# Patient Record
Sex: FEMALE | Race: WHITE
[De-identification: names, ages, dates, MRNs, and addresses within clinical notes are randomized per-mention and may not be internally consistent; named-entity substitution may affect disease eponyms.]

---

## 2019-10-09 ENCOUNTER — HOSPITAL ENCOUNTER (EMERGENCY)
Dept: HOSPITAL 74 - JER | Age: 64
Discharge: HOME | End: 2019-10-09
Payer: COMMERCIAL

## 2019-10-09 VITALS — DIASTOLIC BLOOD PRESSURE: 78 MMHG | SYSTOLIC BLOOD PRESSURE: 155 MMHG | HEART RATE: 75 BPM

## 2019-10-09 VITALS — TEMPERATURE: 99.4 F

## 2019-10-09 VITALS — BODY MASS INDEX: 27.8 KG/M2

## 2019-10-09 DIAGNOSIS — E11.9: ICD-10-CM

## 2019-10-09 DIAGNOSIS — R42: Primary | ICD-10-CM

## 2019-10-09 DIAGNOSIS — Z85.3: ICD-10-CM

## 2019-10-09 DIAGNOSIS — I10: ICD-10-CM

## 2019-10-09 LAB
ALBUMIN SERPL-MCNC: 3.8 G/DL (ref 3.4–5)
ALP SERPL-CCNC: 91 U/L (ref 45–117)
ALT SERPL-CCNC: 44 U/L (ref 13–61)
ANION GAP SERPL CALC-SCNC: 7 MMOL/L (ref 8–16)
AST SERPL-CCNC: 16 U/L (ref 15–37)
BASOPHILS # BLD: 0.4 % (ref 0–2)
BILIRUB SERPL-MCNC: 0.2 MG/DL (ref 0.2–1)
BUN SERPL-MCNC: 18 MG/DL (ref 7–18)
CALCIUM SERPL-MCNC: 9.2 MG/DL (ref 8.5–10.1)
CHLORIDE SERPL-SCNC: 108 MMOL/L (ref 98–107)
CO2 SERPL-SCNC: 25 MMOL/L (ref 21–32)
CREAT SERPL-MCNC: 0.6 MG/DL (ref 0.55–1.3)
DEPRECATED RDW RBC AUTO: 14 % (ref 11.6–15.6)
EOSINOPHIL # BLD: 0.9 % (ref 0–4.5)
GLUCOSE SERPL-MCNC: 105 MG/DL (ref 74–106)
HCT VFR BLD CALC: 43.7 % (ref 32.4–45.2)
HGB BLD-MCNC: 14.5 GM/DL (ref 10.7–15.3)
LYMPHOCYTES # BLD: 29 % (ref 8–40)
MCH RBC QN AUTO: 31 PG (ref 25.7–33.7)
MCHC RBC AUTO-ENTMCNC: 33.1 G/DL (ref 32–36)
MCV RBC: 93.5 FL (ref 80–96)
MONOCYTES # BLD AUTO: 8 % (ref 3.8–10.2)
NEUTROPHILS # BLD: 61.7 % (ref 42.8–82.8)
PLATELET # BLD AUTO: 266 K/MM3 (ref 134–434)
PMV BLD: 8.2 FL (ref 7.5–11.1)
POTASSIUM SERPLBLD-SCNC: 4.5 MMOL/L (ref 3.5–5.1)
PROT SERPL-MCNC: 7.2 G/DL (ref 6.4–8.2)
RBC # BLD AUTO: 4.67 M/MM3 (ref 3.6–5.2)
SODIUM SERPL-SCNC: 140 MMOL/L (ref 136–145)
WBC # BLD AUTO: 7.8 K/MM3 (ref 4–10)

## 2019-10-09 NOTE — PDOC
Attending Attestation





- Resident


Resident Name: FreddiePreston





- ED Attending Attestation


I have performed the following: I have examined & evaluated the patient, The 

case was reviewed & discussed with the resident, I agree w/resident's findings 

& plan





- HPI


HPI: 





10/09/19 17:49





64 YOF with h/o HTN, DM2 presenting with dizziness and subsequent fall.





she had dizzy spell while cleaning lamp, fell down, no LOC or head injury


able to get up from ground and walk. 


no cp/sob, palp


dizziness episode x 20 minutes, since resolved





yesterday, she had palpitations x <1 minute, also not associated with cp or sob 

or syncope.


no prior cardiac history.


started on new ARB agent by primary, Dr Burk 4 days ago.





10/09/19 20:38








- Physicial Exam


PE: 





10/09/19 17:50





Agree with the resident's HPI and PE as documented in the electronic medical 

record.


NAD, well appearing, EOMI, PERRL,  nl conjunctiva, anicteric; neck supple. 

lungs clear, RRR, abdomen soft nontender. no rebound, guarding. Back nontender. 

FLOREZ x4, no focal neuro deficits. No peripheral edema. normal color for ethnicity

, WWP.





Alert, oriented to person time and place. CN II-XII grossly intact. Strength 

prox and distally 5/5 throughout. Sensation grossly intact to light touch. FLOREZ 

x4. No cerebellar signs, no dysmetria, bilateral finger to nose and heel to 

shin equal and symmetric. Speech clear. 








10/09/19 18:57





10/09/19 20:37








- Medical Decision Making





10/09/19 20:37


Vital Signs











Temp Pulse Resp BP Pulse Ox


 


 99.4 F   75   18   155/78   100 


 


 10/09/19 15:59  10/09/19 19:41  10/09/19 19:41  10/09/19 19:41  10/09/19 19:41








ddx arrhythmia, ACS, CNS lesion, mass, CVA, vertigo, peripheral vs central 

vertigo, orthostatics, medication side effect





labs and lytes wnl


EKG unremarkable, trop neg, doubt ACS or cardiac given no cp or sob


neuro intact, no focal deficits


CT head with microvascular changes, no acute infarct/mass/bleed


asymptomatic in the ED


will trial meclizine


dizziness could be related to new antihypertensive agent, started on ARB by 

primary several days ago this week


anticipate discharge, primary and neuro followup, rx med prn for dizziness, 

meclizine, return precautions. stable condition for discharge





**Heart Score/ECG Review


  ** #1


ECG reviewed & interpreted by me at: 17:15


General ECG Interpretation: Sinus Rhythm, Normal Rate, Normal Intervals


Compared to previous ECG there are: Previous ECG unavail

## 2019-10-09 NOTE — PDOC
History of Present Illness





- General


Chief Complaint: Lightheaded


Stated Complaint: DIZZYNESS / HYPERTENSION


Time Seen by Provider: 10/09/19 16:49


History Source: Patient


Exam Limitations: No Limitations





- History of Present Illness


Initial Comments: 





10/09/19 17:44


63 yo female pmh HTN and DM presents to the ED after dizzy spell and fall. Pt 

states while trying to clean her ceiling light, she stood on top of a seat 2 

feet off the ground, became suddenly dizzy and fell without hitting her head or 

LOC. Pt fell onto her left side denies any pain or difficulty ambulating, no 

bruises. Pt states she was able to get up off the ground on her own and the 

dizziness subsided after 20 min. Denies CP, palpitations or SOB prior to or 

after  the fall, denies HA, changes in vision, neck pain/stiffness, confusion, 

changes in strength or sensation to 1 side of her body. Pt admits to recent 

addition of sartan medication within the last 1 week 














Past History





- Past Medical History


Allergies/Adverse Reactions: 


 Allergies











Allergy/AdvReac Type Severity Reaction Status Date / Time


 


No Known Allergies Allergy   Verified 10/09/19 16:01











Home Medications: 


Ambulatory Orders





Methylprednisolone [Medrol Dose Abrahan] 4 mg PO ASDIR 6 Days #21 tablet MDD 24mg 04 /03/18 


Meclizine HCl 25 mg PO TID #21 tablet 10/09/19 


Meclizine HCl [Antivert -] 25 mg PO TID #21 tablet 10/09/19 








Cancer: Yes (breast)


COPD: No


Diabetes: Yes


HTN: Yes





- Psycho Social/Smoking Cessation Hx


Smoking History: Never smoked


Hx Alcohol Use: No


Drug/Substance Use Hx: No





**Review of Systems





- Review of Systems


Constitutional: No: Chills, Fever


HEENTM: No: Blurred Vision, Double Vision, Ear Pain, Hearing Loss


Respiratory: No: Shortness of Breath


Cardiac (ROS): No: Chest Pain


ABD/GI: No: Constipated, Diarrhea, Nausea, Vomiting


: No: Burning, Dysuria, Frequency, Flank Pain


Neurological: Yes: Dizziness (resolved).  No: Headache, Numbness, Paresthesia, 

Weakness, Unsteady Gait, Ataxia





*Physical Exam





- Vital Signs


 Last Vital Signs











Temp Pulse Resp BP Pulse Ox


 


 99.4 F   80   16   148/79   99 


 


 10/09/19 15:59  10/09/19 15:59  10/09/19 15:59  10/09/19 15:59  10/09/19 15:59














- Physical Exam


General Appearance: Yes: Nourished, Appropriately Dressed.  No: Apparent 

Distress


HEENT: positive: EOMI, LINDA, Normal Voice, TMs Normal, Pharynx Normal, Hearing 

Grossly Normal.  negative: Photophobia, TM Erythema


Neck: positive: Supple.  negative: Carotid bruit


Respiratory/Chest: positive: Lungs Clear, Normal Breath Sounds.  negative: 

Respiratory Distress, Crackles, Rales, Rhonchi, Stridor, Wheezing


Cardiovascular: positive: Regular Rhythm, Regular Rate, S1, S2.  negative: Edema

, JVD, Murmur


Vascular Pulses: Dorsalis-Pedis (R): 4+, Doralis-Pedis (L): 4+


Gastrointestinal/Abdominal: positive: Flat, Soft.  negative: Pulsatile Mass, 

Protuberent, Distended, Guarding, Rebound, Tenderness


Musculoskeletal: negative: CVA Tenderness


Extremity: positive: Normal Capillary Refill, Normal Inspection, Normal Range 

of Motion


Integumentary: positive: Normal Color, Dry, Warm


Neurologic: positive: CNs II-XII NML intact, Fully Oriented, Alert, Normal Mood/

Affect, Normal Response, Motor Strength 5/5.  negative: Facial Droop, Numbness, 

Sensory Deficit, Finger to Nose (normal), Confused, Disoriented





ED Treatment Course





- LABORATORY


CBC & Chemistry Diagram: 


 10/09/19 17:20





 10/09/19 17:20





- ADDITIONAL ORDERS


Additional order review: 


 











  10/09/19





  17:20


 


RBC  4.67


 


MCV  93.5


 


MCHC  33.1


 


RDW  14.0


 


MPV  8.2


 


Neutrophils %  61.7


 


Lymphocytes %  29.0


 


Monocytes %  8.0


 


Eosinophils %  0.9


 


Basophils %  0.4














Medical Decision Making





- Medical Decision Making





10/09/19 19:34


63 yo female pmh HTN and DM presents to the ED after dizzy spell and fall. Pt 

states while trying to clean her ceiling light, she stood on top of a seat 2 

feet off the ground, became suddenly dizzy and fell without hitting her head or 

LOC. Pt fell onto her left side denies any pain or difficulty ambulating, no 

bruises. Pt states she was able to get up off the ground on her own and the 

dizziness subsided after 20 min. Denies CP, palpitations or SOB prior to or 

after  the fall, denies HA, changes in vision, neck pain/stiffness, confusion, 

changes in strength or sensation to 1 side of her body. Pt admits to recent 

addition of sartan medication within the last 1 week 








vitals WNL





EKG NSR without acute ischemic changes 





Labs neg for infection, anemia, metabolic ab, trops neg





Head CT neg for acute stroke, it shows chronic mircovascular changes 





Pt had sudden dizzy spell after outstretching her arms upwards, likely 

vasovagal vs vertigo. No LOC, HA or neuro changes. Head CT neg, EKG and trops 

neg, no CP 





Pt safe for DC home with PCP f/u, neuro f/u and Cardiology follow up





pt ambulates without difficulty, NAD and no current medical complaints 





Pt agrees with and understands plan








Discharge





- Discharge Information


Problems reviewed: Yes


Clinical Impression/Diagnosis: 


 Vertigo





Condition: Good


Disposition: HOME





- Admission


No





- Follow up/Referral


Referrals: 


Kristian Meyers MD [Primary Care Provider] - 


Hayden Mclaughlin MD [Staff Physician] - 


Indio Romano MD [Staff Physician] - 





- Patient Discharge Instructions


Patient Printed Discharge Instructions:  DI for Vertigo


Additional Instructions: 


Please see your Primary Doctor and make an appointment to see the Neurologist 

and Cardiologist as soon as possible. Take the medication meclizine sent to 

your pharmacy as prescribed. Return to the ER for new or concerning symptoms 

including but not limited to: headaches, loss of balance, chest pain, 

palpitations, fevers or inability to eat or drink.





Thank you





- Post Discharge Activity

## 2019-10-10 NOTE — EKG
Test Reason : 

Blood Pressure : ***/*** mmHG

Vent. Rate : 075 BPM     Atrial Rate : 075 BPM

   P-R Int : 154 ms          QRS Dur : 084 ms

    QT Int : 376 ms       P-R-T Axes : 065 046 059 degrees

   QTc Int : 419 ms

 

NORMAL SINUS RHYTHM

NORMAL ECG

WHEN COMPARED WITH ECG OF 02-APR-2003 12:57,

NO SIGNIFICANT CHANGE WAS FOUND

Confirmed by CATE GORE MD ([id number]) on 10/10/2019 12:19:54 PM

 

Referred By:             Confirmed By:CATE GORE MD

## 2025-02-18 ENCOUNTER — HOSPITAL ENCOUNTER (OUTPATIENT)
Dept: HOSPITAL 74 - JASU-SURG | Age: 70
Discharge: HOME | End: 2025-02-18
Attending: UROLOGY
Payer: COMMERCIAL

## 2025-02-18 VITALS — TEMPERATURE: 97.3 F

## 2025-02-18 VITALS — SYSTOLIC BLOOD PRESSURE: 114 MMHG | HEART RATE: 65 BPM | DIASTOLIC BLOOD PRESSURE: 72 MMHG

## 2025-02-18 VITALS — BODY MASS INDEX: 25.9 KG/M2

## 2025-02-18 VITALS — RESPIRATION RATE: 20 BRPM

## 2025-02-18 DIAGNOSIS — N20.0: Primary | ICD-10-CM

## 2025-02-18 PROCEDURE — 0TF4XZZ FRAGMENTATION IN LEFT KIDNEY PELVIS, EXTERNAL APPROACH: ICD-10-PCS | Performed by: UROLOGY
